# Patient Record
Sex: FEMALE | Race: BLACK OR AFRICAN AMERICAN | URBAN - METROPOLITAN AREA
[De-identification: names, ages, dates, MRNs, and addresses within clinical notes are randomized per-mention and may not be internally consistent; named-entity substitution may affect disease eponyms.]

---

## 2022-08-15 ENCOUNTER — OFFICE VISIT (OUTPATIENT)
Dept: URBAN - METROPOLITAN AREA CLINIC 92 | Facility: CLINIC | Age: 62
End: 2022-08-15

## 2022-08-15 NOTE — HPI-TODAY'S VISIT:
62-year-old patient presents today with complaints of dysphagia. Was previously being seen by Dr. Rodrigue damon and Dr. Leobardo Gilbert in 2018.  She was diagnosed with chronic hep C last note from Dr. Gilbert states Epclusa was on hold due to interactions with her medications.  She did not follow-up after 9-2018 appointment. She saw Dr. Damon on 11- for symptomatic anemia with hemoglobin of 5 requiring admission at [facility].  At that time patient was asymptomatic and denied any bleeding.  At that time she was being worked up for an ampullary mass that was found on the MRI of the abdomen on 9-.  Repeat MRI on 1-5-2018 showed that the periampullary mass was less conspicuous. Dr. Damon performed a EGD/EUS on 1- which showed patient did not have leonard-ampullary mass and expectant management was recommended. Last colonoscopy 12-:Was not able to be performed due to poor poor bowel prep. Last EGD 12-:Showed mild gastritis with no infection. Patient was lost to follow-up after.